# Patient Record
Sex: MALE
[De-identification: names, ages, dates, MRNs, and addresses within clinical notes are randomized per-mention and may not be internally consistent; named-entity substitution may affect disease eponyms.]

---

## 2017-11-04 ENCOUNTER — HOSPITAL ENCOUNTER (EMERGENCY)
Dept: HOSPITAL 10 - FTE | Age: 37
Discharge: HOME | End: 2017-11-04
Payer: MEDICAID

## 2017-11-04 VITALS
BODY MASS INDEX: 22.82 KG/M2 | WEIGHT: 141.98 LBS | HEIGHT: 66 IN | HEIGHT: 66 IN | WEIGHT: 141.98 LBS | BODY MASS INDEX: 22.82 KG/M2

## 2017-11-04 DIAGNOSIS — J18.1: Primary | ICD-10-CM

## 2017-11-04 DIAGNOSIS — F17.210: ICD-10-CM

## 2017-11-04 PROCEDURE — 96372 THER/PROPH/DIAG INJ SC/IM: CPT

## 2017-11-04 PROCEDURE — 71020: CPT

## 2017-11-04 NOTE — RADRPT
PROCEDURE:   XR Chest. 

 

CLINICAL INDICATION:  Cough and fever

 

TECHNIQUE:  Single portable view of the chest was obtained. 

 

COMPARISON:   None. 

 

FINDINGS:

Normal cardiomediastinal silhouette. Left upper lobe airspace opacity.. No pleural effusion or pneum
othorax.

 

IMPRESSION:

Left upper lobe airspace opacity consistent with pneumonia. Recommend follow-up radiograph after com
pletion of treatment.

 

RPTAT:AAJJ

_____________________________________________ 

Physician Benton           Date    Time 

Electronically viewed and signed by Brian Romero Physician on 11/04/2017 20:03 

 

D:  11/04/2017 20:03  T:  11/04/2017 20:03

/

## 2017-11-04 NOTE — ERD
ER Documentation


Chief Complaint


Chief Complaint


Complains of chest pain and fever x 2 days





HPI


37-year-old male complains of fever and cough, body aches and pleuritic chest 

pain for last 2 days.  He has no vomiting, abdominal pain, diarrhea, neck 

stiffness, rashes.





ROS


All systems reviewed and are negative except as per history of present illness.





Medications


Home Meds


Active Scripts


Promethazine HCl/Codeine (Prometh-Codein 6.25-10 mg/5 ml) 5 Ml Syrup, 5 ML PO 

QID for 5 Days


   4 OZ


   Prov:AHMET JONES MD         11/4/17


Levofloxacin* (Levaquin*) 500 Mg Tablet, 500 MG PO DAILY for 7 Days, TAB


   Prov:AHMET JONES MD         11/4/17


Oseltamivir Phosphate* (Tamiflu*) 75 Mg Capsule, 75 MG PO BID for 7 Days, CAP


   Prov:AHMET JONES MD         11/4/17


Ibuprofen* (Motrin*) 600 Mg Tab, 600 MG PO Q6, #20 TAB


   Prov:AHMET JONES MD         11/4/17





Allergies


Allergies:  


Coded Allergies:  


     No Known Allergy (Unverified , 11/4/17)





PMhx/Soc


History of Surgery:  No


Anesthesia Reaction:  No


Hx Neurological Disorder:  No


Hx Respiratory Disorders:  No


Hx Cardiac Disorders:  No


Hx Psychiatric Problems:  No


Hx Miscellaneous Medical Probl:  No


Hx Alcohol Use:  No


Hx Substance Use:  No


Hx Tobacco Use:  Yes (10-15 cigs per day)


Smoking Status:  Current every day smoker





FmHx


Family History:  No coronary disease, No diabetes, No other





Physical Exam


Vitals





Vital Signs








  Date Time  Temp Pulse Resp B/P Pulse Ox O2 Delivery O2 Flow Rate FiO2


 


11/4/17 17:00 102.4 126 20 173/81 98   








Physical Exam


Const:  [], Non-ill-appearing.


Head:   Atraumatic 


Eyes:    Normal Conjunctiva


ENT:    Normal External Ears, Nose and Mouth.  TMs normal oropharynx normal.


Neck:               Full range of motion..~ No meningismus.


Resp:    Clear to auscultation bilaterally.  Coarse cough without rales or 

wheezing or retractions appreciated.


Cardio:    Regular rate and rhythm, no murmurs


Abd:    Soft, non tender, non distended. Normal bowel sounds


Skin:    No petechiae or rashes


Back:    No midline or flank tenderness


Ext:    No cyanosis, or edema


Neur:    Awake and alert


Psych:    Normal Mood and Affect


Results 24 hrs





 Current Medications








 Medications


  (Trade)  Dose


 Ordered  Sig/Joon


 Route


 PRN Reason  Start Time


 Stop Time Status Last Admin


Dose Admin


 


 Ibuprofen


  (Motrin)  600 mg  ONCE  ONCE


 PO


   11/4/17 18:30


 11/4/17 18:31 DC 11/4/17 18:16


 


 


 Acetaminophen


  (Tylenol Tab)  650 mg  ONCE  ONCE


 PO


   11/4/17 18:30


 11/4/17 18:31 DC 11/4/17 18:16


 


 


 Ceftriaxone Sodium


  (Rocephin)  1 gm  ONCE  ONCE


 IM


   11/4/17 19:00


 11/4/17 19:01 DC 11/4/17 19:04


 


 


 Lidocaine


  (Xylocaine 1%


  (Mdv) 20 ml)  20 ml  ONCE  ONCE


 SC


   11/4/17 19:00


 11/4/17 19:01 DC 11/4/17 19:05


 











Procedures/MDM


Chest X-ray 1V Interpreted by me:


Soft Tissue:                                               No acute 

abnormalities


Bones:                                                    No acute abnormalities


Mediastinum/Cardiac Silhouette/Lungs:     Upper lobe infiltrate.  Impression-

left upper lobe infiltrate.








Patient presents with signs of fever and URI and signs of pneumonia on x-ray.  

He may have influenza as well.  He will treated with Levaquin, Tamiflu, fever 

control primary care follow-up and return precautions.  There is no evidence of 

hypoxemia, respiratory distress.





EKG: 


Rate/Rhythm:             [Normal Sinus Rhythm] rate equals 132.


QRS, ST, T-waves:    [No changes consistent w/ acute ischemia]


Impression:      [No evidence of ischemia or arrhythmia] depression-sinus 

tachycardia otherwise no findings of acute ischemia.








Patient is improvement in tachycardia with fever control.  Patient shows no 

signs of sepsis and is alert and well-appearing.  Patient is advised to recheck 

with primary doctor or return to ER for new or worsening symptoms.  The patient 

was stable with no new complaints during the ER course. Clinically, there is no 

current evidence to suggest meningitis, sepsis, acute abdomen acute coronary 

syndrome, pulmonary embolism, or any other emergent condition appearing to 

require further evaluation or hospitalization. The patient should certainly 

return for any new or worsening symptoms per the aftercare instructions. They 

should otherwise follow-up with her primary care doctor for reevaluation this 

week.





Departure


Diagnosis:  


 Primary Impression:  


 Pneumonia


 Pneumonia type:  due to unspecified organism  Laterality:  left  Lung location

:  upper lobe of lung  Qualified Code:  J18.1 - Pneumonia of left upper lobe 

due to infectious organism


Condition:  Stable


Patient Instructions:  Fever Control (Adult), Pneumonia (Adult)





Additional Instructions:  


X-ray shows pneumonia.  May have influenza as well.  We will treat for both.  

Recheck for new or worsening symptoms or primary care doctor.











AHMET JONES MD Nov 4, 2017 19:40

## 2018-11-06 ENCOUNTER — HOSPITAL ENCOUNTER (EMERGENCY)
Age: 38
Discharge: LEFT BEFORE DISCHARGE | End: 2018-11-06

## 2018-11-06 ENCOUNTER — HOSPITAL ENCOUNTER (EMERGENCY)
Dept: HOSPITAL 91 - E/R | Age: 38
Discharge: LEFT BEFORE DISCHARGE | End: 2018-11-06
Payer: SELF-PAY

## 2018-11-06 DIAGNOSIS — Z53.21: Primary | ICD-10-CM
